# Patient Record
(demographics unavailable — no encounter records)

---

## 2024-10-23 NOTE — ASSESSMENT
[FreeTextEntry1] : This is a 61-year-old female with complaints of right sided lower back.  The pain travels across the lower back to the top of the buttock.  Patient notes a prior history of lower back pain which was associated with sciatic symptoms.  She denies any radicular features into the lower extremities at this time.  We will obtain x-rays of the lumbar spine for further evaluation.  In the interim I advised that she begin trialing physical therapy and will refill naproxen and cyclobenzaprine. Patient will follow-up in 3 weeks for reassessment and to review imaging. All this patient's questions were answered and the conversation was understood well.  Will order a lumbar spine 2 view x-ray due to pain and decrease in range of motion in that area to delineate a pain generator.  Physical therapy of the lumbar spine 2-3 times a week for 4-8 weeks stressing a home exercise program of walking, shoulder girdle strengthening,  swimming, elliptical , recumbent bike, Sivakumar chi and Yoga. Use things that heat like hot shower or icy heat before rehab, exercising and at the beginning of the day, and ice (ice in a bag never directly on the skin) after activity and at the end of the day.    Entered by Ana Neri, acting as scribe for Dr. Arredondo.  Documentation recorded by the scribe, in my presence, accurately reflects the service I personally performed, and the decisions made by me with my edits as appropriate.     Thank you for allowing me to assist in the management of this patient.     Best Regards,     Tammy Arredondo M.D., FAAPMR     Diplomate, American Board of Physical Medicine and Rehabilitation Diplomate, American Board of Pain Medicine

## 2024-10-23 NOTE — PHYSICAL EXAM
[Normal Coordination] : normal coordination [Normal DTR UE/LE] : normal DTR UE/LE  [Normal Sensation] : normal sensation [Normal Mood and Affect] : normal mood and affect [Oriented] : oriented [Able to Communicate] : able to communicate [Well Developed] : well developed [Well Nourished] : well nourished [] : negative sitting straight leg raise [TWNoteComboBox7] : forward flexion 30 degrees [de-identified] : extension 10 degrees

## 2024-10-23 NOTE — HISTORY OF PRESENT ILLNESS
[FreeTextEntry1] : This is a 61-year-old female here to establish care for right sided lower back pain. The pain has been ongoing for 10 days. She was sitting down having dinner when she was transiting from a sit to stand position. There was a shooting pain across the lower back into the buttock. She notes a prior history of lower back pain which was associated with sciatic symptoms that was resolved with the help of physical therapy. She is not currently experiencing any radicular symptoms with her current flare up. She has not recently physical therapy for lower back pain. Patient is utilizing Cyclobenzaprine, Naproxen, Advil which provides her with moderate relief of her symptoms. Imaging is needed for further review. She is moving to [place] in 3 weeks.

## 2024-10-23 NOTE — DATA REVIEWED
[FreeTextEntry1] : Lumbar x-rays obtained in the office show scoliosis of the lumbar spine convex to the Right. DDD and spondylosis moderate at T12-L1 and L1-2 and severe at L4-5.

## 2025-04-17 NOTE — HISTORY OF PRESENT ILLNESS
[Y] : Positive pregnancy history [___] : #2 ([unfilled]): [Pregnancy History] : girl [TextBox_4] : 61-year-old  postmenopausal, she denies any vaginal bleeding.  She has permanently moved to [place] and is only in [place] currently over [facility] to visit relatives.  She states that today will be her last visit here and once she finds a gynecologist where she lives she will ask us to forward her records. [Mammogramdate] : 10/12/2023 [PapSmeardate] : 4/17/25 [ColonoscopyDate] : 5/22/2023 [PGHxTotal] : 5 [[facility] : 2 [PGHxAbortions] : 3 [[facility]] : 2 [Currently Active] : currently active [Men] : men

## 2025-04-17 NOTE — PHYSICAL EXAM
[Chaperoned Physical Exam] : A chaperone was present in the examining room during all aspects of the physical examination. [MA] : MA [Examination Of The Breasts] : a normal appearance [No Masses] : no breast masses were palpable [Labia Majora] : normal [Labia Minora] : normal [Normal] : normal [FreeTextEntry2] : [place]